# Patient Record
Sex: FEMALE | Race: BLACK OR AFRICAN AMERICAN | NOT HISPANIC OR LATINO
[De-identification: names, ages, dates, MRNs, and addresses within clinical notes are randomized per-mention and may not be internally consistent; named-entity substitution may affect disease eponyms.]

---

## 2019-01-19 ENCOUNTER — APPOINTMENT (OUTPATIENT)
Dept: ORTHOPEDIC SURGERY | Facility: CLINIC | Age: 59
End: 2019-01-19
Payer: COMMERCIAL

## 2019-01-19 VITALS
BODY MASS INDEX: 24.55 KG/M2 | HEIGHT: 61 IN | DIASTOLIC BLOOD PRESSURE: 84 MMHG | HEART RATE: 70 BPM | WEIGHT: 130 LBS | SYSTOLIC BLOOD PRESSURE: 147 MMHG

## 2019-01-19 DIAGNOSIS — F17.200 NICOTINE DEPENDENCE, UNSPECIFIED, UNCOMPLICATED: ICD-10-CM

## 2019-01-19 DIAGNOSIS — Z78.9 OTHER SPECIFIED HEALTH STATUS: ICD-10-CM

## 2019-01-19 PROBLEM — Z00.00 ENCOUNTER FOR PREVENTIVE HEALTH EXAMINATION: Status: ACTIVE | Noted: 2019-01-19

## 2019-01-19 PROCEDURE — 73630 X-RAY EXAM OF FOOT: CPT | Mod: RT

## 2019-01-19 PROCEDURE — 99244 OFF/OP CNSLTJ NEW/EST MOD 40: CPT

## 2019-01-19 RX ORDER — DICLOFENAC SODIUM 10 MG/G
1 GEL TOPICAL
Qty: 1 | Refills: 2 | Status: ACTIVE | COMMUNITY
Start: 2019-01-19 | End: 1900-01-01

## 2019-01-19 NOTE — CONSULT LETTER
[Consult Letter:] : I had the pleasure of evaluating your patient, [unfilled]. [Please see my note below.] : Please see my note below. [Consult Closing:] : Thank you very much for allowing me to participate in the care of this patient.  If you have any questions, please do not hesitate to contact me. [Sincerely,] : Sincerely, [Dear  ___] : Dear ~ELSIE, [FreeTextEntry2] : none [FreeTextEntry3] : Dr. Wilber Geronimo

## 2019-01-19 NOTE — PHYSICAL EXAM
[de-identified] : General: Alert and oriented x3. In no acute distress. Pleasant in nature with a normal affect. No apparent respiratory distress. \par \par Bilateral Standing Exam:\par o Inspection: Elongated second toe, symmetric arch height\par \par Right Foot Exam: \par o Skin: Clean, dry, intact\par o Inspection: No obvious malalignment, no swelling, no effusion, hallux valgus, dorsal medial bump, plantar lateral callus at base of the 5th metatarsal  \par o Pulses: 2+ DP/PT pulses\par o ROM: Normal FOOT ROM of digits, ANKLE neutral degrees of dorsiflexion, 40 degrees of plantarflexion, 10 degrees of subtalar motion.\par o ROM Great toe: 40 degrees of plantarflexion, 50 degrees of dorsiflexion. Pain at the extremes of ROM. \par o Painful ROM: None\par o Tenderness: tenderness to the 2nd, 3rd, and 4th web spaces, no pain to the 5th metatarsal.\par o Stability: Negative anterior/posterior drawer.\par o Strength: 5/5 ADD/ABD/TA/GS/EHL/FHL/EDL\par o Neuro: Sensation intact to light touch throughout\par o Additional tests: Enriquez's Test (-), Pam'ls (-) tarsal tunnel, Single Heel Raise (-)  [de-identified] : 3V of the right foot were ordered obtained and reviewed by me today, 01/19/2019,  revealed  spiral fracture of her 5th metatarsal well healed, slight hallux rigidus

## 2019-01-19 NOTE — HISTORY OF PRESENT ILLNESS
[FreeTextEntry1] : 58 year old female presents for an evaluation of right foot pain. Her pain began on 11/16/2017 she has a spiral fracture of her 5th metatarsal of her right foot. She was treated with a CAM boot and bone stimulator. She then return to work, as a , in April 2018. In September 2018 she notes that she was still experiencing tightness along the dorsum of her right foot. Today she rates her pain a 2/10 and says that it is exacerbated with movement of her great toe. She was being seen by Dr. Raya and her sent her for physical therapy in 9/15/2018, for her pain and reports improvements in her symptoms. Pt notes that she smokes a pack a day and has done so for the past 10 years \par \par

## 2019-01-19 NOTE — ADDENDUM
[FreeTextEntry1] : I, Radha Mehran, acted solely as a scribe for Dr. Wilber Geronimo on this date 01/19/2019 .\par \par All medical record entries made by the Scribe were at my, Dr. Wilbre Geronimo, direction and personally dictated by me on 01/19/2019. I have reviewed the chart and agree that the record accurately reflects my personal performance of the history, physical exam, assessment and plan. I have also personally directed, reviewed, and agreed with the chart.

## 2019-01-19 NOTE — DISCUSSION/SUMMARY
[de-identified] : Today in the office I had a lengthy discussion with the patient regarding her right foot pain. I have addressed all of the patient's concern surrounding the pathology of their conditions. An MRI of the right foot was ordered, she is to FU after imaging is obtained. I recommend that she continue with physical therapy in the interim, as well as utilizing Voltaren gel. A prescription for Voltaren was provided and is to be taken as instructed. The pt is to call me as soon as possible if they notice any worsening pain or symptoms. All questions were answered and the patient verbalized understanding. The patient is in agreement with this treatment plan. I would like to see the pt back in the office once imaging is obtained.

## 2019-02-01 ENCOUNTER — FORM ENCOUNTER (OUTPATIENT)
Age: 59
End: 2019-02-01

## 2019-02-02 ENCOUNTER — OUTPATIENT (OUTPATIENT)
Dept: OUTPATIENT SERVICES | Facility: HOSPITAL | Age: 59
LOS: 1 days | End: 2019-02-02
Payer: COMMERCIAL

## 2019-02-02 ENCOUNTER — APPOINTMENT (OUTPATIENT)
Dept: MRI IMAGING | Facility: CLINIC | Age: 59
End: 2019-02-02
Payer: COMMERCIAL

## 2019-02-02 DIAGNOSIS — M79.671 PAIN IN RIGHT FOOT: ICD-10-CM

## 2019-02-02 DIAGNOSIS — M20.21 HALLUX RIGIDUS, RIGHT FOOT: ICD-10-CM

## 2019-02-02 PROCEDURE — 73718 MRI LOWER EXTREMITY W/O DYE: CPT

## 2019-02-02 PROCEDURE — 73718 MRI LOWER EXTREMITY W/O DYE: CPT | Mod: 26,RT

## 2019-02-11 ENCOUNTER — APPOINTMENT (OUTPATIENT)
Dept: ORTHOPEDIC SURGERY | Facility: CLINIC | Age: 59
End: 2019-02-11
Payer: COMMERCIAL

## 2019-02-11 DIAGNOSIS — M20.21 HALLUX RIGIDUS, RIGHT FOOT: ICD-10-CM

## 2019-02-11 DIAGNOSIS — M79.671 PAIN IN RIGHT FOOT: ICD-10-CM

## 2019-02-11 PROCEDURE — 99214 OFFICE O/P EST MOD 30 MIN: CPT

## 2019-02-11 NOTE — PHYSICAL EXAM
[de-identified] : General: Alert and oriented x3. In no acute distress. Pleasant in nature with a normal affect. No apparent respiratory distress. \par \par Bilateral Standing Exam:\par o Inspection: Elongated second toe, symmetric arch height\par \par Right Foot Exam: \par o Skin: Clean, dry, intact\par o Inspection: No obvious malalignment, no swelling, no effusion, hallux valgus, dorsal medial bump, plantar lateral callus at base of the 5th metatarsal  \par o Pulses: 2+ DP/PT pulses\par o ROM: Normal FOOT ROM of digits, ANKLE neutral degrees of dorsiflexion, 40 degrees of plantarflexion, 10 degrees of subtalar motion.\par o ROM Great toe: 40 degrees of plantarflexion, 50 degrees of dorsiflexion. Pain at the extremes of ROM. \par o Painful ROM: None\par o Tenderness: tenderness to the 2nd, 3rd, and 4th web spaces, no pain to the 5th metatarsal.\par o Stability: Negative anterior/posterior drawer.\par o Strength: 5/5 ADD/ABD/TA/GS/EHL/FHL/EDL\par o Neuro: Sensation intact to light touch throughout\par o Additional tests: Enriquez's Test (-), Pam'ls (-) tarsal tunnel, Single Heel Raise (-)  [de-identified] : MRI from [facility] foot on 2/2/19 obtained and reviewed by me today, 02/11/2019 , revealed: \par \par 1. Advanced healing of fifth metatarsal fracture in near-anatomic alignment. \par 2. Moderate first MCP joint degenerative arthrosis. \par 3. Mild third and fourth TMT joint degenerative arthrosis. \par 4. Small nonspecific first through fourth MTP joint effusions. \par 5. Mild second flexor tenosynovitis. \par 6. Thickening and degeneration of the lateral second and medial third \par plantar plates, without tear. \par 7. Osteochondral defect with flattening of the medial talar dome partially \par visualized in the sagittal STIR sequence\par \par \par

## 2019-02-11 NOTE — HISTORY OF PRESENT ILLNESS
[FreeTextEntry1] : 58 year year old female presenting with right foot pain. She also presents today for an MRI review. The patient’s pain is noted to be a 6-7/10. The patient describes their pain as the same compared to their last visit. She states that she experiences tightness in the right foot. The patient denies any other rheumatological problems. She is currently using Voltaren gel. She presents wearing regular shoes. No other complaints at this time. \par \par States her foot continues to feel tight but no pain.

## 2019-02-11 NOTE — ADDENDUM
[FreeTextEntry1] : I, Vikas Watson, acted solely as a scribe for Dr. Wilber Geronimo on this date 02/11/2019  .\par  \par All medical record entries made by the Scribe were at my, Dr. Wilber Geronimo, direction and personally dictated by me on 02/11/2019 . I have reviewed the chart and agree that the record accurately reflects my personal performance of the history, physical exam, assessment and plan. I have also personally directed, reviewed, and agreed with the chart.\par \par \par

## 2019-02-11 NOTE — DISCUSSION/SUMMARY
[de-identified] : Today I had a lengthy discussion with the patient regarding their right foot pain.I have addressed all the patient's concerns surrounding the pathology of their condition. I reviewed the patient's MRI and educated them about the nature of their ailment. I advised the patient to utilize inserts and additional cushioning in her shoes. I also advised the patient to utilize ice, NSAIDs PRN, and heat. They can also elevate their right foot above the level of their heart. I recommend that the patient transition out of physical therapy and begin to perform the exercises on her own at home. The patient understood and verbally agreed to the treatment plan. All of their questions were answered and they were satisfied with the visit. The patient should call the office if they have any questions or experience worsening symptoms.

## 2023-09-26 ENCOUNTER — APPOINTMENT (OUTPATIENT)
Dept: OTOLARYNGOLOGY | Facility: CLINIC | Age: 63
End: 2023-09-26
Payer: COMMERCIAL

## 2023-09-26 VITALS
WEIGHT: 130 LBS | BODY MASS INDEX: 23.92 KG/M2 | HEIGHT: 62 IN | TEMPERATURE: 96.8 F | DIASTOLIC BLOOD PRESSURE: 81 MMHG | SYSTOLIC BLOOD PRESSURE: 134 MMHG | HEART RATE: 81 BPM

## 2023-09-26 DIAGNOSIS — H61.21 IMPACTED CERUMEN, RIGHT EAR: ICD-10-CM

## 2023-09-26 PROCEDURE — 30903 CONTROL OF NOSEBLEED: CPT | Mod: RT

## 2023-09-26 PROCEDURE — 99203 OFFICE O/P NEW LOW 30 MIN: CPT | Mod: 25

## 2023-09-27 ENCOUNTER — APPOINTMENT (OUTPATIENT)
Dept: OTOLARYNGOLOGY | Facility: CLINIC | Age: 63
End: 2023-09-27
Payer: COMMERCIAL

## 2023-09-27 VITALS — HEIGHT: 62 IN | WEIGHT: 130 LBS | BODY MASS INDEX: 23.92 KG/M2

## 2023-09-27 DIAGNOSIS — J31.0 CHRONIC RHINITIS: ICD-10-CM

## 2023-09-27 PROCEDURE — 31231 NASAL ENDOSCOPY DX: CPT | Mod: 59

## 2023-09-27 PROCEDURE — 99213 OFFICE O/P EST LOW 20 MIN: CPT | Mod: 25

## 2023-09-27 PROCEDURE — 30905 CONTROL OF NOSEBLEED: CPT | Mod: RT

## 2023-09-27 RX ORDER — AMOXICILLIN 875 MG/1
875 TABLET, FILM COATED ORAL TWICE DAILY
Qty: 20 | Refills: 2 | Status: ACTIVE | COMMUNITY
Start: 2023-09-27 | End: 1900-01-01

## 2023-09-28 ENCOUNTER — NON-APPOINTMENT (OUTPATIENT)
Age: 63
End: 2023-09-28

## 2023-10-02 ENCOUNTER — APPOINTMENT (OUTPATIENT)
Dept: OTOLARYNGOLOGY | Facility: CLINIC | Age: 63
End: 2023-10-02
Payer: COMMERCIAL

## 2023-10-02 VITALS — WEIGHT: 130 LBS | BODY MASS INDEX: 23.92 KG/M2 | HEIGHT: 62 IN | TEMPERATURE: 97.6 F

## 2023-10-02 DIAGNOSIS — R04.0 EPISTAXIS: ICD-10-CM

## 2023-10-02 PROCEDURE — 99213 OFFICE O/P EST LOW 20 MIN: CPT

## 2023-10-06 ENCOUNTER — NON-APPOINTMENT (OUTPATIENT)
Age: 63
End: 2023-10-06

## 2023-10-09 ENCOUNTER — NON-APPOINTMENT (OUTPATIENT)
Age: 63
End: 2023-10-09